# Patient Record
(demographics unavailable — no encounter records)

---

## 2025-07-29 NOTE — PHYSICAL EXAM
[de-identified] : cerumen impaction in both ear. Wax removed microsuction.  [Normal] : mucosa is normal [Midline] : trachea located in midline position

## 2025-07-29 NOTE — HISTORY OF PRESENT ILLNESS
[de-identified] : 69 yr old male pt presents today for initial evaluation for clogged ears. Pt reports it started about 4 weeks ago. Pt states tingling in his right ear. Pt reports no pain. Pt can hear well but not like it used to be. Pt states he has wax build up. Pt says his hearing sounds muffled. No further complaints